# Patient Record
Sex: FEMALE | Race: WHITE | NOT HISPANIC OR LATINO | Employment: OTHER | URBAN - METROPOLITAN AREA
[De-identification: names, ages, dates, MRNs, and addresses within clinical notes are randomized per-mention and may not be internally consistent; named-entity substitution may affect disease eponyms.]

---

## 2017-05-11 ENCOUNTER — PREPPED CHART (OUTPATIENT)
Dept: URBAN - METROPOLITAN AREA CLINIC 39 | Facility: CLINIC | Age: 57
End: 2017-05-11

## 2017-08-24 ENCOUNTER — FOLLOW UP (OUTPATIENT)
Dept: URBAN - METROPOLITAN AREA CLINIC 39 | Facility: CLINIC | Age: 57
End: 2017-08-24

## 2017-08-24 DIAGNOSIS — H04.123: ICD-10-CM

## 2017-08-24 DIAGNOSIS — H10.9: ICD-10-CM

## 2017-08-24 PROCEDURE — 99212 OFFICE O/P EST SF 10 MIN: CPT

## 2017-08-24 PROCEDURE — 68761S PUNCTUM PLUG / SILICONE,EACH

## 2017-08-24 ASSESSMENT — VISUAL ACUITY
OS_CC: 20/25
OS_BAT: 20/25
OD_CC: 20/25
OD_CC: J2
OD_SC: 20/<400
OU_CC: 20/25
OD_SC: J5
OS_CC: J1
OS_SC: 20/<400
OD_BAT: 20/25
OS_CC: 20/25
OS_SC: J6
OD_CC: 20/25

## 2017-08-24 ASSESSMENT — TONOMETRY
OS_IOP_MMHG: 15
OD_IOP_MMHG: 14

## 2017-09-07 ENCOUNTER — FOLLOW UP (OUTPATIENT)
Dept: URBAN - METROPOLITAN AREA CLINIC 39 | Facility: CLINIC | Age: 57
End: 2017-09-07

## 2017-09-07 DIAGNOSIS — H04.123: ICD-10-CM

## 2017-09-07 DIAGNOSIS — H10.9: ICD-10-CM

## 2017-09-07 PROCEDURE — 92012 INTRM OPH EXAM EST PATIENT: CPT

## 2017-09-07 RX ORDER — LOTEPREDNOL ETABONATE 2 MG/ML
1 SUSPENSION/ DROPS OPHTHALMIC
Start: 2017-09-07 | End: 2017-09-28

## 2017-09-07 ASSESSMENT — VISUAL ACUITY
OD_CC: 20/25
OS_CC: 20/25-2

## 2017-09-07 ASSESSMENT — TONOMETRY
OS_IOP_MMHG: 15
OD_IOP_MMHG: 15

## 2017-10-02 NOTE — PROCEDURE NOTE: CLINICAL
PROCEDURE NOTE: YAG Capsulotomy OD. Diagnosis: Visually Significant PCO. Anesthesia: Topical. Prior to treatment, the risks/benefits/alternatives were discussed. The patient wished to proceed with procedure. Power = 2.8 mJ. Number of pulses = 16. Patient tolerated procedure well. There were no complications. 1 gtt of Alphagan was instilled after laser. Post laser IOP = 11 mmHg. Post-procedure instructions given. Scot Arboleda

## 2017-10-03 NOTE — PROCEDURE NOTE: CLINICAL
PROCEDURE NOTE: YAG Capsulotomy OS. Diagnosis: Visually Significant PCO. Anesthesia: Topical. Prior to treatment, the risks/benefits/alternatives were discussed. The patient wished to proceed with procedure. Power = 3.5 mJ. Number of pulses = 15. Patient tolerated procedure well. There were no complications. 1 gtt of Alphagan was instilled after laser. Post laser IOP = 11 mmHg. Post-procedure instructions given. Amelia Navarro

## 2017-10-04 NOTE — PATIENT DISCUSSION
The patient is status post Yag laser Capsulotomy in the right eye. The vision shows nice improvement.

## 2018-05-10 ENCOUNTER — ESTABLISHED COMPREHENSIVE EXAM (OUTPATIENT)
Dept: URBAN - METROPOLITAN AREA CLINIC 39 | Facility: CLINIC | Age: 58
End: 2018-05-10

## 2018-05-10 DIAGNOSIS — H00.026: ICD-10-CM

## 2018-05-10 DIAGNOSIS — H04.123: ICD-10-CM

## 2018-05-10 DIAGNOSIS — H00.023: ICD-10-CM

## 2018-05-10 PROCEDURE — 92015 DETERMINE REFRACTIVE STATE: CPT

## 2018-05-10 PROCEDURE — 92014 COMPRE OPH EXAM EST PT 1/>: CPT

## 2018-05-10 ASSESSMENT — VISUAL ACUITY
OS_CC: 20/25
OD_SC: 20/400
OS_CC: J1
OS_SC: J6
OD_CC: J1
OD_SC: J4
OD_CC: 20/25

## 2018-05-10 ASSESSMENT — TONOMETRY
OD_IOP_MMHG: 14
OS_IOP_MMHG: 15

## 2020-03-19 ENCOUNTER — ESTABLISHED COMPREHENSIVE EXAM (OUTPATIENT)
Dept: URBAN - METROPOLITAN AREA CLINIC 39 | Facility: CLINIC | Age: 60
End: 2020-03-19

## 2020-03-19 DIAGNOSIS — Z79.899: ICD-10-CM

## 2020-03-19 DIAGNOSIS — H52.203: ICD-10-CM

## 2020-03-19 DIAGNOSIS — H52.4: ICD-10-CM

## 2020-03-19 DIAGNOSIS — H04.123: ICD-10-CM

## 2020-03-19 DIAGNOSIS — H02.886: ICD-10-CM

## 2020-03-19 DIAGNOSIS — H52.13: ICD-10-CM

## 2020-03-19 DIAGNOSIS — H02.883: ICD-10-CM

## 2020-03-19 PROCEDURE — 92015 DETERMINE REFRACTIVE STATE: CPT

## 2020-03-19 PROCEDURE — 92014 COMPRE OPH EXAM EST PT 1/>: CPT

## 2020-03-19 ASSESSMENT — TONOMETRY
OD_IOP_MMHG: 14
OS_IOP_MMHG: 14

## 2020-03-19 ASSESSMENT — VISUAL ACUITY
OD_SC: J4
OD_SC: 20/400-
OS_CC: 20/30-2
OS_CC: J1
OS_SC: J4
OS_SC: 20/400-
OD_CC: 20/25-2
OD_CC: J1

## 2020-05-06 ENCOUNTER — ESTABLISHED COMPREHENSIVE EXAM (OUTPATIENT)
Dept: URBAN - METROPOLITAN AREA CLINIC 39 | Facility: CLINIC | Age: 60
End: 2020-05-06

## 2020-05-06 DIAGNOSIS — H02.883: ICD-10-CM

## 2020-05-06 DIAGNOSIS — Z79.899: ICD-10-CM

## 2020-05-06 DIAGNOSIS — H02.886: ICD-10-CM

## 2020-05-06 DIAGNOSIS — H04.123: ICD-10-CM

## 2020-05-06 PROCEDURE — 99211T TECH SERVICE

## 2020-05-06 PROCEDURE — 92083 EXTENDED VISUAL FIELD XM: CPT

## 2021-03-02 ENCOUNTER — EST. PATIENT EMERGENCY (OUTPATIENT)
Dept: URBAN - METROPOLITAN AREA CLINIC 39 | Facility: CLINIC | Age: 61
End: 2021-03-02

## 2021-03-02 DIAGNOSIS — H43.811: ICD-10-CM

## 2021-03-02 PROCEDURE — 92014 COMPRE OPH EXAM EST PT 1/>: CPT

## 2021-03-02 ASSESSMENT — TONOMETRY
OD_IOP_MMHG: 14
OS_IOP_MMHG: 14

## 2021-03-02 ASSESSMENT — VISUAL ACUITY
OD_CC: 20/20-1
OS_CC: 20/25-2

## 2023-04-06 ENCOUNTER — COMPREHENSIVE EXAM (OUTPATIENT)
Dept: URBAN - METROPOLITAN AREA CLINIC 39 | Facility: CLINIC | Age: 63
End: 2023-04-06

## 2023-04-06 DIAGNOSIS — H52.13: ICD-10-CM

## 2023-04-06 DIAGNOSIS — H43.811: ICD-10-CM

## 2023-04-06 DIAGNOSIS — H04.123: ICD-10-CM

## 2023-04-06 PROCEDURE — 92014 COMPRE OPH EXAM EST PT 1/>: CPT

## 2023-04-06 PROCEDURE — 92015 DETERMINE REFRACTIVE STATE: CPT

## 2023-04-06 ASSESSMENT — VISUAL ACUITY
OD_SC: J4
OU_CC: J2
OS_SC: J4
OU_SC: J4
OD_CC: 20/20
OS_CC: J2
OU_SC: 20/400
OD_CC: J2
OU_CC: 20/20
OD_SC: 20/400
OS_CC: 20/25
OS_SC: CF 6FT

## 2023-04-06 ASSESSMENT — TONOMETRY
OS_IOP_MMHG: 16
OD_IOP_MMHG: 14

## 2024-04-29 ENCOUNTER — OV NP (OUTPATIENT)
Dept: URBAN - METROPOLITAN AREA CLINIC 60 | Facility: CLINIC | Age: 64
End: 2024-04-29
Payer: OTHER GOVERNMENT

## 2024-04-29 VITALS
SYSTOLIC BLOOD PRESSURE: 122 MMHG | HEIGHT: 63 IN | BODY MASS INDEX: 47.84 KG/M2 | TEMPERATURE: 98.7 F | HEART RATE: 71 BPM | OXYGEN SATURATION: 98 % | WEIGHT: 270 LBS | DIASTOLIC BLOOD PRESSURE: 76 MMHG

## 2024-04-29 DIAGNOSIS — Z12.11 COLON CANCER SCREENING: ICD-10-CM

## 2024-04-29 DIAGNOSIS — K31.84 GASTROPARESIS: ICD-10-CM

## 2024-04-29 DIAGNOSIS — K58.0 IRRITABLE BOWEL SYNDROME WITH DIARRHEA: ICD-10-CM

## 2024-04-29 DIAGNOSIS — K21.9 GERD WITHOUT ESOPHAGITIS: ICD-10-CM

## 2024-04-29 PROBLEM — 266435005: Status: ACTIVE | Noted: 2024-04-29

## 2024-04-29 PROBLEM — 197125005: Status: ACTIVE | Noted: 2024-04-29

## 2024-04-29 PROBLEM — 235675006: Status: ACTIVE | Noted: 2024-04-29

## 2024-04-29 PROCEDURE — 99204 OFFICE O/P NEW MOD 45 MIN: CPT

## 2024-04-29 RX ORDER — ONDANSETRON 4 MG/1
TABLET, ORALLY DISINTEGRATING ORAL
Qty: 30 TABLET | Status: ACTIVE | COMMUNITY

## 2024-04-29 RX ORDER — DICYCLOMINE HYDROCHLORIDE 10 MG/1
2 CAPSULES CAPSULE ORAL THREE TIMES A DAY
Qty: 180 CAPSULE | Refills: 1 | OUTPATIENT
Start: 2024-04-29 | End: 2024-06-28

## 2024-04-29 RX ORDER — HYDROXYCHLOROQUINE SULFATE 200 MG/1
AS DIRECTED TABLET ORAL
Status: ACTIVE | COMMUNITY

## 2024-04-29 RX ORDER — PREDNISONE 1 MG/1
TABLET ORAL
Qty: 360 TABLET | Status: ACTIVE | COMMUNITY

## 2024-04-29 RX ORDER — CHOLESTYRAMINE 4 G/9G
1 SCOOP POWDER, FOR SUSPENSION ORAL ONCE A DAY
Qty: 90 | Refills: 3 | OUTPATIENT
Start: 2024-04-29

## 2024-04-29 RX ORDER — LEVOTHYROXINE SODIUM 88 UG/1
TABLET ORAL
Qty: 90 TABLET | Status: ACTIVE | COMMUNITY

## 2024-04-29 NOTE — HPI-PREVIOUS IMAGING
Gastric emptying study 6/2022, 92% retention at 2 hours, 63% retention at 4 hours.  Demonstrated delay with 100% retention at 1 hour

## 2024-04-29 NOTE — HPI-PREVIOUS PROCEDURES
EGD 4/21/2022 consistent with Z-line 32 cm, normal duodenum, retained bilious fluid, no celiac no H. pylori acute and chronic reflux at GE junction. Colonoscopy 4/21/2022 for history of adenoma, lymphocytic colitis and difficulty passing bowel movements consistent with grade 1 internal hemorrhoids Normal terminal ileum, normal mucosa, biopsy negative for lymphocytic colitis.

## 2024-04-29 NOTE — HPI-ZZZTODAY'S VISIT
Silvia is a very pleasant 63-year-old female who presents for gastroparesis.  She is new patient to our practice will be establishing with Dr. Espinal today.  Past medical history significant for colon polyps, GERD, IBS, arthritis, hyperlipidemia, hypertension, nerve/muscle disease, fibromyalgia, gastroparesis, lymphocytic colitis, Sjogren's. Past surgical history significant for cholecystectomy, uterine ablation, orthopedic procedure, , tonsillectomy.  Last colonoscopy and EGD 2022.  Family history significant for paternal rectal cancer.  Patient has previously been on budesonide taper for lymphocytic colitis and recommended to use Pepto-Bismol instead. Possible use of Xifaxan has been discussed previously. Patient was given samples previously of Gimoti. In addition she was given a refill for budesonide. She was also referred to nutrition for further evaluation.  Silvia presents to establish care. She did not feel that the Gimoti or budesonide helped. She takes imodium a day to keep her bowel in control but still has bowel movements a "couple" times a day. Pepto Bismol did no seem to help much either. She denies dysphagia, dyspepsia, pyrosis, abdominal pain, change in bowel habits, unintentional weight loss, melena, or hematochezia.

## 2024-05-08 ENCOUNTER — WEB ENCOUNTER (OUTPATIENT)
Dept: URBAN - METROPOLITAN AREA CLINIC 60 | Facility: CLINIC | Age: 64
End: 2024-05-08

## 2024-07-29 ENCOUNTER — DASHBOARD ENCOUNTERS (OUTPATIENT)
Age: 64
End: 2024-07-29

## 2024-07-29 ENCOUNTER — OFFICE VISIT (OUTPATIENT)
Dept: URBAN - METROPOLITAN AREA CLINIC 60 | Facility: CLINIC | Age: 64
End: 2024-07-29
Payer: OTHER GOVERNMENT

## 2024-07-29 VITALS
DIASTOLIC BLOOD PRESSURE: 74 MMHG | HEART RATE: 74 BPM | WEIGHT: 171.2 LBS | TEMPERATURE: 98.2 F | BODY MASS INDEX: 30.33 KG/M2 | HEIGHT: 63 IN | OXYGEN SATURATION: 98 % | SYSTOLIC BLOOD PRESSURE: 124 MMHG

## 2024-07-29 DIAGNOSIS — K58.0 IRRITABLE BOWEL SYNDROME WITH DIARRHEA: ICD-10-CM

## 2024-07-29 DIAGNOSIS — K31.84 GASTROPARESIS: ICD-10-CM

## 2024-07-29 PROCEDURE — 99214 OFFICE O/P EST MOD 30 MIN: CPT

## 2024-07-29 RX ORDER — ACETAMINOPHEN 325 MG
1 TABLET AS NEEDED TABLET ORAL
Status: ACTIVE | COMMUNITY
Start: 2024-07-29

## 2024-07-29 RX ORDER — GABAPENTIN 300 MG/1
CAPSULE ORAL
Qty: 30 CAPSULE | Status: ACTIVE | COMMUNITY

## 2024-07-29 RX ORDER — MULTIVITAMIN
1 TABLET TABLET ORAL ONCE A DAY
Qty: 30 | Status: ACTIVE | COMMUNITY
Start: 2024-07-29

## 2024-07-29 RX ORDER — LEVOTHYROXINE SODIUM 88 UG/1
TABLET ORAL
Qty: 90 TABLET | Status: ACTIVE | COMMUNITY

## 2024-07-29 RX ORDER — RIFAXIMIN 550 MG/1
1 TABLET TABLET ORAL THREE TIMES A DAY
Qty: 42 TABLET | Refills: 0 | OUTPATIENT
Start: 2024-07-29

## 2024-07-29 RX ORDER — CHOLESTYRAMINE 4 G/9G
1 SCOOP POWDER, FOR SUSPENSION ORAL ONCE A DAY
Qty: 90 | Refills: 3 | Status: ACTIVE | COMMUNITY
Start: 2024-04-29

## 2024-07-29 NOTE — HPI-ZZZTODAY'S VISIT
Silvia is a very pleasant 63-year-old female who presents for gastroparesis and chronic diarrhea.  She is new patient to our practice will be establishing with Dr. Espinal today.  Past medical history significant for colon polyps, GERD, IBS, arthritis, hyperlipidemia, hypertension, nerve/muscle disease, fibromyalgia, gastroparesis, lymphocytic colitis Sjogren's.  Past surgical history significant for cholecystectomy, uterine ablation, orthopedic procedure, , tonsillectomy.  Last colonoscopy and EGD 2022.  Family history significant for paternal rectal cancer.  Patient has previously been on budesonide taper for lymphocytic colitis and recommended to use Pepto-Bismol instead. Patient was given samples previously of Gimoti. In addition she was given a refill for budesonide. She was also referred to nutrition for further evaluation which turned out to not be covered by . She did not feel she got much use out of her one meeting.  History of chronic diarrhea. She did not feel that the Gimoti or budesonide helped. She takes 1-2 imodium a day to keep her bowel in control but still has bowel movements a "couple" times a day. Pepto Bismol did no seem to help much either. She tried cholestyramine but could not tolerated it.  She was wondering if there were any alternatives to help.  We did discuss her multiple etiologies for her chronic diarrhea and she has had a previous diagnosis of lymphocytic colitis but of note her last colonoscopy with biopsies negative for it, cholecystectomy as well as decades issues with IBS-D.  We discussed her different options and she would like to pursue Xifaxan therapy.  She denies dysphagia, dyspepsia, pyrosis, abdominal pain, change in bowel habits, unintentional weight loss, melena, or hematochezia.

## 2024-08-01 ENCOUNTER — APPOINTMENT (RX ONLY)
Dept: URBAN - METROPOLITAN AREA CLINIC 148 | Facility: CLINIC | Age: 64
Setting detail: DERMATOLOGY
End: 2024-08-01

## 2024-08-01 DIAGNOSIS — L57.0 ACTINIC KERATOSIS: ICD-10-CM

## 2024-08-01 DIAGNOSIS — D18.0 HEMANGIOMA: ICD-10-CM

## 2024-08-01 DIAGNOSIS — D485 NEOPLASM OF UNCERTAIN BEHAVIOR OF SKIN: ICD-10-CM

## 2024-08-01 DIAGNOSIS — L81.4 OTHER MELANIN HYPERPIGMENTATION: ICD-10-CM

## 2024-08-01 DIAGNOSIS — L82.0 INFLAMED SEBORRHEIC KERATOSIS: ICD-10-CM

## 2024-08-01 DIAGNOSIS — L82.1 OTHER SEBORRHEIC KERATOSIS: ICD-10-CM

## 2024-08-01 DIAGNOSIS — D22 MELANOCYTIC NEVI: ICD-10-CM

## 2024-08-01 PROBLEM — D22.5 MELANOCYTIC NEVI OF TRUNK: Status: ACTIVE | Noted: 2024-08-01

## 2024-08-01 PROBLEM — D18.01 HEMANGIOMA OF SKIN AND SUBCUTANEOUS TISSUE: Status: ACTIVE | Noted: 2024-08-01

## 2024-08-01 PROBLEM — D48.5 NEOPLASM OF UNCERTAIN BEHAVIOR OF SKIN: Status: ACTIVE | Noted: 2024-08-01

## 2024-08-01 PROCEDURE — ? SUNSCREEN RECOMMENDATIONS

## 2024-08-01 PROCEDURE — ? COUNSELING

## 2024-08-01 PROCEDURE — ? BIOPSY BY SHAVE METHOD

## 2024-08-01 PROCEDURE — 17003 DESTRUCT PREMALG LES 2-14: CPT

## 2024-08-01 PROCEDURE — ? DEFER

## 2024-08-01 PROCEDURE — ? LIQUID NITROGEN

## 2024-08-01 PROCEDURE — 17000 DESTRUCT PREMALG LESION: CPT | Mod: 59

## 2024-08-01 PROCEDURE — ? FULL BODY SKIN EXAM

## 2024-08-01 PROCEDURE — 99203 OFFICE O/P NEW LOW 30 MIN: CPT | Mod: 25

## 2024-08-01 PROCEDURE — 11102 TANGNTL BX SKIN SINGLE LES: CPT

## 2024-08-01 ASSESSMENT — LOCATION DETAILED DESCRIPTION DERM
LOCATION DETAILED: LEFT PROXIMAL DORSAL FOREARM
LOCATION DETAILED: EPIGASTRIC SKIN
LOCATION DETAILED: NASAL DORSUM
LOCATION DETAILED: LEFT INFRAMAMMARY CREASE (INNER QUADRANT)
LOCATION DETAILED: RIGHT LATERAL CANTHUS
LOCATION DETAILED: RIGHT INFRAMAMMARY CREASE (INNER QUADRANT)
LOCATION DETAILED: GLABELLA
LOCATION DETAILED: RIGHT PROXIMAL DORSAL FOREARM
LOCATION DETAILED: UPPER STERNUM
LOCATION DETAILED: LEFT SUPERIOR UPPER BACK
LOCATION DETAILED: RIGHT SUPERIOR UPPER BACK
LOCATION DETAILED: RIGHT MID-UPPER BACK
LOCATION DETAILED: LEFT INFERIOR ANTERIOR NECK
LOCATION DETAILED: MIDDLE STERNUM

## 2024-08-01 ASSESSMENT — LOCATION ZONE DERM
LOCATION ZONE: FACE
LOCATION ZONE: NECK
LOCATION ZONE: ARM
LOCATION ZONE: TRUNK
LOCATION ZONE: NOSE
LOCATION ZONE: EYELID

## 2024-08-01 ASSESSMENT — LOCATION SIMPLE DESCRIPTION DERM
LOCATION SIMPLE: RIGHT FOREARM
LOCATION SIMPLE: RIGHT BREAST
LOCATION SIMPLE: LEFT ANTERIOR NECK
LOCATION SIMPLE: ABDOMEN
LOCATION SIMPLE: LEFT FOREARM
LOCATION SIMPLE: GLABELLA
LOCATION SIMPLE: LEFT UPPER BACK
LOCATION SIMPLE: RIGHT EYELID
LOCATION SIMPLE: CHEST
LOCATION SIMPLE: LEFT BREAST
LOCATION SIMPLE: RIGHT UPPER BACK
LOCATION SIMPLE: NOSE

## 2024-08-01 NOTE — PROCEDURE: DEFER
Procedure To Be Performed At Next Visit: Cautery
Introduction Text (Please End With A Colon): The following procedure was deferred:
Size Of Lesion In Cm (Optional): 0
Detail Level: Simple

## 2024-08-01 NOTE — PROCEDURE: LIQUID NITROGEN
Duration Of Freeze Thaw-Cycle (Seconds): 2
Number Of Freeze-Thaw Cycles: 2 freeze-thaw cycles
Detail Level: Zone
Post-Care Instructions: I reviewed with the patient in detail post-care instructions. Patient is to wear sunprotection, and avoid picking at any of the treated lesions. Pt may apply Vaseline to crusted or scabbing areas.
Render Note In Bullet Format When Appropriate: No
Show Aperture Variable?: Yes
Consent: The patient's consent was obtained including but not limited to risks of crusting, scabbing, blistering, scarring, darker or lighter pigmentary change, recurrence, incomplete removal and infection.

## 2024-08-01 NOTE — PROCEDURE: REASSURANCE
Hide Include Location In Plan Question?: No
Additional Note: Benign appearing
Detail Level: Zone
09-Mar-2023

## 2024-08-05 ENCOUNTER — WEB ENCOUNTER (OUTPATIENT)
Dept: URBAN - METROPOLITAN AREA CLINIC 60 | Facility: CLINIC | Age: 64
End: 2024-08-05

## 2024-08-05 RX ORDER — RIFAXIMIN 550 MG/1
1 TABLET TABLET ORAL THREE TIMES A DAY
Qty: 42 TABLET | Refills: 0
Start: 2024-08-06 | End: 2024-08-20

## 2024-08-20 ENCOUNTER — TELEPHONE ENCOUNTER (OUTPATIENT)
Dept: URBAN - METROPOLITAN AREA CLINIC 63 | Facility: CLINIC | Age: 64
End: 2024-08-20

## 2024-08-27 ENCOUNTER — WEB ENCOUNTER (OUTPATIENT)
Dept: URBAN - METROPOLITAN AREA CLINIC 60 | Facility: CLINIC | Age: 64
End: 2024-08-27

## 2024-09-09 ENCOUNTER — OFFICE VISIT (OUTPATIENT)
Dept: URBAN - METROPOLITAN AREA CLINIC 60 | Facility: CLINIC | Age: 64
End: 2024-09-09
Payer: OTHER GOVERNMENT

## 2024-09-09 VITALS
BODY MASS INDEX: 28.84 KG/M2 | HEART RATE: 62 BPM | HEIGHT: 63 IN | SYSTOLIC BLOOD PRESSURE: 126 MMHG | TEMPERATURE: 98.6 F | OXYGEN SATURATION: 99 % | WEIGHT: 162.8 LBS | DIASTOLIC BLOOD PRESSURE: 72 MMHG

## 2024-09-09 DIAGNOSIS — K52.9 CHRONIC DIARRHEA: ICD-10-CM

## 2024-09-09 DIAGNOSIS — Z90.49 S/P CHOLECYSTECTOMY: ICD-10-CM

## 2024-09-09 DIAGNOSIS — K31.84 GASTROPARESIS: ICD-10-CM

## 2024-09-09 PROBLEM — 428882003: Status: ACTIVE | Noted: 2024-09-09

## 2024-09-09 PROCEDURE — 99214 OFFICE O/P EST MOD 30 MIN: CPT

## 2024-09-09 RX ORDER — MULTIVITAMIN
1 TABLET TABLET ORAL ONCE A DAY
Qty: 30 | Status: ACTIVE | COMMUNITY
Start: 2024-07-29

## 2024-09-09 RX ORDER — COLESTIPOL HYDROCHLORIDE 1 G/1
2 TABLETS TABLET, FILM COATED ORAL TWICE DAILY
Qty: 120 | Refills: 3 | OUTPATIENT
Start: 2024-09-09

## 2024-09-09 RX ORDER — ACETAMINOPHEN 325 MG/1
1 TABLET AS NEEDED TABLET, FILM COATED ORAL
Status: ACTIVE | COMMUNITY
Start: 2024-07-29

## 2024-09-09 RX ORDER — LEVOTHYROXINE SODIUM 88 UG/1
TABLET ORAL
Qty: 90 TABLET | Status: ACTIVE | COMMUNITY

## 2024-09-09 RX ORDER — LOPERAMIDE HCL 2 MG
1 CAPSULE AS NEEDED CAPSULE ORAL
Status: ACTIVE | COMMUNITY

## 2024-09-09 RX ORDER — CHOLESTYRAMINE 4 G/9G
1 SCOOP POWDER, FOR SUSPENSION ORAL ONCE A DAY
Qty: 90 | Refills: 3 | Status: ON HOLD | COMMUNITY
Start: 2024-04-29

## 2024-09-09 RX ORDER — GABAPENTIN 300 MG/1
CAPSULE ORAL
Qty: 30 CAPSULE | Status: ACTIVE | COMMUNITY

## 2024-09-09 RX ORDER — CEVIMELINE HYDROCHLORIDE 30 MG/1
1 CAPSULE CAPSULE ORAL THREE TIMES A DAY
Status: ACTIVE | COMMUNITY

## 2024-09-09 NOTE — HPI-ZZZTODAY'S VISIT
Patient returns for follow-up of gastroparesis and chronic diarrhea.  She tried 14 days Xifaxan treatment and did call the office and spoke with Dr. Espinal as patient was having heart palpitations while on medication.  He advised her to finish out her last 3 days of Xifaxan and to go to the ER if she develops chest pain or any worsening of her symptoms.  Her heart palpitations have completely resolved.  She did not notice any difference on the Xifaxan treatment.  Patient has complicated PMH with history of cholecystectomy, IBS-D as well as lymphocytic colitis seen on last colonoscopy in .  She is a nonresponder to budesonide, Xifaxan.  Imodium with only partial response.  She tried cholestyramine but could not tolerate it.  Her gastroparesis is well-managed on small meals throughout the day.  She does notice if she has a large meal she will "pay for it" and be bloated.  She continues to have 2-3 diarrhea bowel movements a day.  No abdominal pain or signs of GI bleeding.  No other GI symptoms.  Last OV 2024: Silvia is a very pleasant 63-year-old female who presents for gastroparesis and chronic diarrhea.  She is new patient to our practice will be establishing with Dr. Espinal today.  Past medical history significant for colon polyps, GERD, IBS, arthritis, hyperlipidemia, hypertension, nerve/muscle disease, fibromyalgia, gastroparesis, lymphocytic colitis Sjogren's.  Past surgical history significant for cholecystectomy, uterine ablation, orthopedic procedure, , tonsillectomy.  Last colonoscopy and EGD 2022.  Family history significant for paternal rectal cancer.  Patient has previously been on budesonide taper for lymphocytic colitis and recommended to use Pepto-Bismol instead. Patient was given samples previously of Gimoti. In addition she was given a refill for budesonide. She was also referred to nutrition for further evaluation which turned out to not be covered by . She did not feel she got much use out of her one meeting.  History of chronic diarrhea. She did not feel that the Gimoti or budesonide helped. She takes 1-2 imodium a day to keep her bowel in control but still has bowel movements a "couple" times a day. Pepto Bismol did no seem to help much either. She tried cholestyramine but could not tolerated it.  She was wondering if there were any alternatives to help.  We did discuss her multiple etiologies for her chronic diarrhea and she has had a previous diagnosis of lymphocytic colitis but of note her last colonoscopy with biopsies negative for it, cholecystectomy as well as decades issues with IBS-D.  We discussed her different options and she would like to pursue Xifaxan therapy.  She denies dysphagia, dyspepsia, pyrosis, abdominal pain, change in bowel habits, unintentional weight loss, melena, or hematochezia.

## 2024-09-09 NOTE — HPI-PREVIOUS PROCEDURES
EGD 4/21/2022 consistent with Z-line 32 cm, normal duodenum, retained bilious fluid, no celiac no H. pylori acute and chronic reflux at GE junction. Colonoscopy 4/21/2022 for history of adenoma, lymphocytic colitis and difficulty passing bowel movements consistent with grade 1 internal hemorrhoids Normal terminal ileum, normal mucosa, biopsy positive for lymphocytic colitis.

## 2024-09-20 ENCOUNTER — APPOINTMENT (RX ONLY)
Dept: URBAN - METROPOLITAN AREA CLINIC 148 | Facility: CLINIC | Age: 64
Setting detail: DERMATOLOGY
End: 2024-09-20

## 2024-09-20 DIAGNOSIS — L82.0 INFLAMED SEBORRHEIC KERATOSIS: ICD-10-CM

## 2024-09-20 DIAGNOSIS — L57.0 ACTINIC KERATOSIS: ICD-10-CM | Status: RESOLVED

## 2024-09-20 PROCEDURE — 99213 OFFICE O/P EST LOW 20 MIN: CPT | Mod: 25

## 2024-09-20 PROCEDURE — 17111 DESTRUCTION B9 LESIONS 15/>: CPT

## 2024-09-20 PROCEDURE — ? BENIGN DESTRUCTION

## 2024-09-20 PROCEDURE — ? COUNSELING

## 2024-09-20 ASSESSMENT — LOCATION DETAILED DESCRIPTION DERM
LOCATION DETAILED: LEFT MEDIAL ZYGOMA
LOCATION DETAILED: LEFT CENTRAL TEMPLE
LOCATION DETAILED: RIGHT SUPERIOR ANTERIOR NECK
LOCATION DETAILED: LEFT CLAVICULAR NECK
LOCATION DETAILED: RIGHT INFERIOR LATERAL NECK
LOCATION DETAILED: RIGHT INFERIOR ANTERIOR NECK
LOCATION DETAILED: LEFT INFERIOR ANTERIOR NECK
LOCATION DETAILED: RIGHT LATERAL CANTHUS
LOCATION DETAILED: GLABELLA
LOCATION DETAILED: RIGHT ANTERIOR SHOULDER
LOCATION DETAILED: RIGHT CLAVICULAR NECK
LOCATION DETAILED: LEFT INFERIOR LATERAL NECK
LOCATION DETAILED: LEFT SUPERIOR UPPER BACK
LOCATION DETAILED: LEFT SUPERIOR LATERAL NECK
LOCATION DETAILED: RIGHT CLAVICULAR SKIN
LOCATION DETAILED: RIGHT SUPERIOR LATERAL NECK

## 2024-09-20 ASSESSMENT — LOCATION ZONE DERM
LOCATION ZONE: ARM
LOCATION ZONE: TRUNK
LOCATION ZONE: EYELID
LOCATION ZONE: FACE
LOCATION ZONE: NECK

## 2024-09-20 ASSESSMENT — LOCATION SIMPLE DESCRIPTION DERM
LOCATION SIMPLE: RIGHT ANTERIOR NECK
LOCATION SIMPLE: RIGHT SHOULDER
LOCATION SIMPLE: LEFT TEMPLE
LOCATION SIMPLE: GLABELLA
LOCATION SIMPLE: RIGHT EYELID
LOCATION SIMPLE: LEFT UPPER BACK
LOCATION SIMPLE: RIGHT CLAVICULAR SKIN
LOCATION SIMPLE: LEFT ZYGOMA
LOCATION SIMPLE: LEFT ANTERIOR NECK

## 2024-09-20 NOTE — PROCEDURE: BENIGN DESTRUCTION
Include Z78.9 (Other Specified Conditions Influencing Health Status) As An Associated Diagnosis?: No
Treatment Number (Will Not Render If 0): 0
Detail Level: Zone
Post-Care Instructions: I reviewed with the patient in detail post-care instructions. Patient is to wear sunprotection, and avoid picking at any of the treated lesions. Pt may apply Vaseline to crusted or scabbing areas.
Medical Necessity Information: It is in your best interest to select a reason for this procedure from the list below. All of these items fulfill various CMS LCD requirements except the new and changing color options.
Consent: The patient's consent was obtained including but not limited to risks of crusting, scabbing, blistering, scarring, darker or lighter pigmentary change, recurrence, incomplete removal and infection.
Medical Necessity Clause: This procedure was medically necessary because the lesions that were treated were:

## 2024-10-19 ENCOUNTER — WEB ENCOUNTER (OUTPATIENT)
Dept: URBAN - METROPOLITAN AREA CLINIC 60 | Facility: CLINIC | Age: 64
End: 2024-10-19

## 2024-10-21 ENCOUNTER — WEB ENCOUNTER (OUTPATIENT)
Dept: URBAN - METROPOLITAN AREA CLINIC 60 | Facility: CLINIC | Age: 64
End: 2024-10-21

## 2024-10-30 ENCOUNTER — APPOINTMENT (RX ONLY)
Dept: URBAN - METROPOLITAN AREA CLINIC 148 | Facility: CLINIC | Age: 64
Setting detail: DERMATOLOGY
End: 2024-10-30

## 2024-10-30 DIAGNOSIS — L82.0 INFLAMED SEBORRHEIC KERATOSIS: ICD-10-CM | Status: RESOLVED

## 2024-10-30 DIAGNOSIS — L82.1 OTHER SEBORRHEIC KERATOSIS: ICD-10-CM

## 2024-10-30 PROCEDURE — 99212 OFFICE O/P EST SF 10 MIN: CPT

## 2024-10-30 PROCEDURE — ? COUNSELING

## 2024-10-30 ASSESSMENT — LOCATION SIMPLE DESCRIPTION DERM
LOCATION SIMPLE: LEFT ANTERIOR NECK
LOCATION SIMPLE: RIGHT ANTERIOR NECK

## 2024-10-30 ASSESSMENT — LOCATION DETAILED DESCRIPTION DERM
LOCATION DETAILED: RIGHT INFERIOR LATERAL NECK
LOCATION DETAILED: RIGHT INFERIOR ANTERIOR NECK
LOCATION DETAILED: LEFT INFERIOR LATERAL NECK

## 2024-10-30 ASSESSMENT — LOCATION ZONE DERM: LOCATION ZONE: NECK

## 2024-12-10 ENCOUNTER — OFFICE VISIT (OUTPATIENT)
Dept: URBAN - METROPOLITAN AREA CLINIC 60 | Facility: CLINIC | Age: 64
End: 2024-12-10
Payer: OTHER GOVERNMENT

## 2024-12-10 VITALS
HEART RATE: 64 BPM | TEMPERATURE: 98.2 F | DIASTOLIC BLOOD PRESSURE: 60 MMHG | HEIGHT: 63 IN | WEIGHT: 166 LBS | BODY MASS INDEX: 29.41 KG/M2 | SYSTOLIC BLOOD PRESSURE: 120 MMHG | OXYGEN SATURATION: 97 %

## 2024-12-10 DIAGNOSIS — Z90.49 S/P CHOLECYSTECTOMY: ICD-10-CM

## 2024-12-10 DIAGNOSIS — K31.84 GASTROPARESIS: ICD-10-CM

## 2024-12-10 DIAGNOSIS — K52.9 CHRONIC DIARRHEA: ICD-10-CM

## 2024-12-10 PROCEDURE — 99214 OFFICE O/P EST MOD 30 MIN: CPT

## 2024-12-10 RX ORDER — LOPERAMIDE HCL 2 MG
1 CAPSULE AS NEEDED CAPSULE ORAL
Status: ACTIVE | COMMUNITY

## 2024-12-10 RX ORDER — COLESTIPOL HYDROCHLORIDE 1 G/1
2 TABLETS TABLET, FILM COATED ORAL TWICE DAILY
Qty: 120 | Refills: 3 | Status: ACTIVE | COMMUNITY
Start: 2024-09-09

## 2024-12-10 RX ORDER — CHOLESTYRAMINE 4 G/9G
1 SCOOP POWDER, FOR SUSPENSION ORAL ONCE A DAY
Qty: 90 | Refills: 3 | Status: ACTIVE | COMMUNITY
Start: 2024-04-29

## 2024-12-10 RX ORDER — GABAPENTIN 300 MG/1
CAPSULE ORAL
Qty: 30 CAPSULE | Status: ACTIVE | COMMUNITY

## 2024-12-10 RX ORDER — CEVIMELINE HYDROCHLORIDE 30 MG/1
1 CAPSULE CAPSULE ORAL THREE TIMES A DAY
Status: ACTIVE | COMMUNITY

## 2024-12-10 RX ORDER — LEVOTHYROXINE SODIUM 88 UG/1
TABLET ORAL
Qty: 90 TABLET | Status: ACTIVE | COMMUNITY

## 2024-12-10 RX ORDER — ACETAMINOPHEN 325 MG/1
1 TABLET AS NEEDED TABLET, FILM COATED ORAL
Status: ACTIVE | COMMUNITY
Start: 2024-07-29

## 2024-12-10 RX ORDER — MULTIVITAMIN
1 TABLET TABLET ORAL ONCE A DAY
Qty: 30 | Status: ACTIVE | COMMUNITY
Start: 2024-07-29

## 2024-12-10 NOTE — HPI-ZZZTODAY'S VISIT
Patient returns to clinic for follow-up for chronic diarrhea secondary to cholecystectomy as well as gastroparesis.  She relates she continue to try to call around for nutritionist and to call her insurance to find a nutritionist that had an overseeing MD that would sign off on notes but was unlucky and unfortunately due to  she is unable to establish with a nutritionist unless an MD is signing her notes and overseeing.  Patient continues to consume low residue frequent small meals which overall helped her symptoms but she still finds at times she feels very bloated and distended after certain meals.  We discussed adding Reglan sparingly and as needed and discuss risks and benefits of the medication and patient would like to try the medication to use as needed.  Still with intermittent diarrhea only somewhat controlled with Colestid 3 tablets twice daily.  Still reporting 2-3 loose/watery stools in the morning but occasionally has an episode when she is eating later in the day where she has to run to her bathroom.  She does state that previously Imodium had worked pretty well for her and she finds if she tries to intake too much of the Colestid it can affect her gastroparesis symptoms.  We discussed adding the Imodium once a day to help control her diarrhea.  No signs of GI bleeding, unintentional weight loss, dysphagia, reflux, vomiting, abdominal pain.  Last OV 2024: Patient returns for follow-up of gastroparesis and chronic diarrhea.  She tried 14 days Xifaxan treatment and did call the office and spoke with Dr. Espinal as patient was having heart palpitations while on medication.  He advised her to finish out her last 3 days of Xifaxan and to go to the ER if she develops chest pain or any worsening of her symptoms.  Her heart palpitations have completely resolved.  She did not notice any difference on the Xifaxan treatment.  Patient has complicated PMH with history of cholecystectomy, IBS-D as well as lymphocytic colitis seen on last colonoscopy in .  She is a nonresponder to budesonide, Xifaxan.  Imodium with only partial response.  She tried cholestyramine but could not tolerate it.  Her gastroparesis is well-managed on small meals throughout the day.  She does notice if she has a large meal she will "pay for it" and be bloated.  She continues to have 2-3 diarrhea bowel movements a day.  No abdominal pain or signs of GI bleeding.  No other GI symptoms.  Last OV 2024: Silvia is a very pleasant 63-year-old female who presents for gastroparesis and chronic diarrhea.  She is new patient to our practice will be establishing with Dr. Espinal today.  Past medical history significant for colon polyps, GERD, IBS, arthritis, hyperlipidemia, hypertension, nerve/muscle disease, fibromyalgia, gastroparesis, lymphocytic colitis Sjogren's.  Past surgical history significant for cholecystectomy, uterine ablation, orthopedic procedure, , tonsillectomy.  Last colonoscopy and EGD 2022.  Family history significant for paternal rectal cancer.  Patient has previously been on budesonide taper for lymphocytic colitis and recommended to use Pepto-Bismol instead. Patient was given samples previously of Gimoti. In addition she was given a refill for budesonide. She was also referred to nutrition for further evaluation which turned out to not be covered by . She did not feel she got much use out of her one meeting.  History of chronic diarrhea. She did not feel that the Gimoti or budesonide helped. She takes 1-2 imodium a day to keep her bowel in control but still has bowel movements a "couple" times a day. Pepto Bismol did no seem to help much either. She tried cholestyramine but could not tolerated it.  She was wondering if there were any alternatives to help.  We did discuss her multiple etiologies for her chronic diarrhea and she has had a previous diagnosis of lymphocytic colitis but of note her last colonoscopy with biopsies negative for it, cholecystectomy as well as decades issues with IBS-D.  We discussed her different options and she would like to pursue Xifaxan therapy.  She denies dysphagia, dyspepsia, pyrosis, abdominal pain, change in bowel habits, unintentional weight loss, melena, or hematochezia.

## 2025-04-07 ENCOUNTER — WEB ENCOUNTER (OUTPATIENT)
Dept: URBAN - METROPOLITAN AREA CLINIC 60 | Facility: CLINIC | Age: 65
End: 2025-04-07

## 2025-06-10 ENCOUNTER — OFFICE VISIT (OUTPATIENT)
Dept: URBAN - METROPOLITAN AREA CLINIC 60 | Facility: CLINIC | Age: 65
End: 2025-06-10

## 2025-06-20 ENCOUNTER — OFFICE VISIT (OUTPATIENT)
Dept: URBAN - METROPOLITAN AREA CLINIC 60 | Facility: CLINIC | Age: 65
End: 2025-06-20

## 2025-06-23 ENCOUNTER — OFFICE VISIT (OUTPATIENT)
Dept: URBAN - METROPOLITAN AREA CLINIC 60 | Facility: CLINIC | Age: 65
End: 2025-06-23
Payer: OTHER GOVERNMENT

## 2025-06-23 DIAGNOSIS — K52.9 CHRONIC DIARRHEA: ICD-10-CM

## 2025-06-23 DIAGNOSIS — Z90.49 S/P CHOLECYSTECTOMY: ICD-10-CM

## 2025-06-23 DIAGNOSIS — K31.84 GASTROPARESIS: ICD-10-CM

## 2025-06-23 PROCEDURE — 99214 OFFICE O/P EST MOD 30 MIN: CPT

## 2025-06-23 RX ORDER — LEVOTHYROXINE SODIUM 88 UG/1
TABLET ORAL
Qty: 90 TABLET | Status: ACTIVE | COMMUNITY

## 2025-06-23 RX ORDER — LOPERAMIDE HCL 2 MG
1 CAPSULE AS NEEDED CAPSULE ORAL
Status: ACTIVE | COMMUNITY

## 2025-06-23 RX ORDER — ACETAMINOPHEN 325 MG/1
1 TABLET AS NEEDED TABLET, FILM COATED ORAL
Status: ACTIVE | COMMUNITY
Start: 2024-07-29

## 2025-06-23 RX ORDER — GABAPENTIN 300 MG/1
CAPSULE ORAL
Qty: 30 CAPSULE | Status: ACTIVE | COMMUNITY

## 2025-06-23 RX ORDER — MULTIVITAMIN
1 TABLET TABLET ORAL ONCE A DAY
Qty: 30 | Status: ACTIVE | COMMUNITY
Start: 2024-07-29

## 2025-06-23 RX ORDER — ROSUVASTATIN 5 MG/1
TABLET, FILM COATED ORAL
Qty: 90 TABLET | Status: ACTIVE | COMMUNITY

## 2025-06-23 RX ORDER — CEVIMELINE HYDROCHLORIDE 30 MG/1
1 CAPSULE CAPSULE ORAL THREE TIMES A DAY
Status: ACTIVE | COMMUNITY

## 2025-06-23 NOTE — HPI-ZZZTODAY'S VISIT
Patient coming in for 6-month follow-up visit.  She reports that she was able to find a dietitian that works with the bariatric surgeon in [place] Dr. Romero.  They would take her [employer] insurance so she is asking for referral which is placed today.  She mostly manages her gastroparesis symptoms doing small, low residue meals and using Reglan if needed.  Chronic diarrhea responds well to Imodium but she feels that taking 1 pill/day causes constipation.  We discussed going to half a tab of Imodium per day.  Unable to take fiber or Colestid as they are difficult for her to digest.  No new symptoms.  OV 2024: Patient returns to clinic for follow-up for chronic diarrhea secondary to cholecystectomy as well as gastroparesis.  She relates she continue to try to call around for nutritionist and to call her insurance to find a nutritionist that had an overseeing MD that would sign off on notes but was unlucky and unfortunately due to  she is unable to establish with a nutritionist unless an MD is signing her notes and overseeing.  Patient continues to consume low residue frequent small meals which overall helped her symptoms but she still finds at times she feels very bloated and distended after certain meals.  We discussed adding Reglan sparingly and as needed and discuss risks and benefits of the medication and patient would like to try the medication to use as needed.  Still with intermittent diarrhea only somewhat controlled with Colestid 3 tablets twice daily.  Still reporting 2-3 loose/watery stools in the morning but occasionally has an episode when she is eating later in the day where she has to run to her bathroom.  She does state that previously Imodium had worked pretty well for her and she finds if she tries to intake too much of the Colestid it can affect her gastroparesis symptoms.  We discussed adding the Imodium once a day to help control her diarrhea.  No signs of GI bleeding, unintentional weight loss, dysphagia, reflux, vomiting, abdominal pain.  Last OV 2024: Patient returns for follow-up of gastroparesis and chronic diarrhea.  She tried 14 days Xifaxan treatment and did call the office and spoke with Dr. Espinal as patient was having heart palpitations while on medication.  He advised her to finish out her last 3 days of Xifaxan and to go to the ER if she develops chest pain or any worsening of her symptoms.  Her heart palpitations have completely resolved.  She did not notice any difference on the Xifaxan treatment.  Patient has complicated PMH with history of cholecystectomy, IBS-D as well as lymphocytic colitis seen on last colonoscopy in .  She is a nonresponder to budesonide, Xifaxan.  Imodium with only partial response.  She tried cholestyramine but could not tolerate it.  Her gastroparesis is well-managed on small meals throughout the day.  She does notice if she has a large meal she will "pay for it" and be bloated.  She continues to have 2-3 diarrhea bowel movements a day.  No abdominal pain or signs of GI bleeding.  No other GI symptoms.  Last OV 2024: Silvia is a very pleasant 63-year-old female who presents for gastroparesis and chronic diarrhea.  She is new patient to our practice will be establishing with Dr. Espinal today.  Past medical history significant for colon polyps, GERD, IBS, arthritis, hyperlipidemia, hypertension, nerve/muscle disease, fibromyalgia, gastroparesis, lymphocytic colitis Sjogren's.  Past surgical history significant for cholecystectomy, uterine ablation, orthopedic procedure, , tonsillectomy.  Last colonoscopy and EGD 2022.  Family history significant for paternal rectal cancer.  Patient has previously been on budesonide taper for lymphocytic colitis and recommended to use Pepto-Bismol instead. Patient was given samples previously of Gimoti. In addition she was given a refill for budesonide. She was also referred to nutrition for further evaluation which turned out to not be covered by . She did not feel she got much use out of her one meeting.  History of chronic diarrhea. She did not feel that the Gimoti or budesonide helped. She takes 1-2 imodium a day to keep her bowel in control but still has bowel movements a "couple" times a day. Pepto Bismol did no seem to help much either. She tried cholestyramine but could not tolerated it.  She was wondering if there were any alternatives to help.  We did discuss her multiple etiologies for her chronic diarrhea and she has had a previous diagnosis of lymphocytic colitis but of note her last colonoscopy with biopsies negative for it, cholecystectomy as well as decades issues with IBS-D.  We discussed her different options and she would like to pursue Xifaxan therapy.  She denies dysphagia, dyspepsia, pyrosis, abdominal pain, change in bowel habits, unintentional weight loss, melena, or hematochezia.

## 2025-08-04 ENCOUNTER — APPOINTMENT (OUTPATIENT)
Dept: URBAN - METROPOLITAN AREA CLINIC 148 | Facility: CLINIC | Age: 65
Setting detail: DERMATOLOGY
End: 2025-08-04

## 2025-08-04 DIAGNOSIS — L72.8 OTHER FOLLICULAR CYSTS OF THE SKIN AND SUBCUTANEOUS TISSUE: ICD-10-CM

## 2025-08-04 DIAGNOSIS — D18.0 HEMANGIOMA: ICD-10-CM

## 2025-08-04 DIAGNOSIS — L82.0 INFLAMED SEBORRHEIC KERATOSIS: ICD-10-CM

## 2025-08-04 DIAGNOSIS — L82.1 OTHER SEBORRHEIC KERATOSIS: ICD-10-CM

## 2025-08-04 DIAGNOSIS — D22 MELANOCYTIC NEVI: ICD-10-CM

## 2025-08-04 DIAGNOSIS — L81.4 OTHER MELANIN HYPERPIGMENTATION: ICD-10-CM

## 2025-08-04 PROBLEM — D18.01 HEMANGIOMA OF SKIN AND SUBCUTANEOUS TISSUE: Status: ACTIVE | Noted: 2025-08-04

## 2025-08-04 PROBLEM — D22.5 MELANOCYTIC NEVI OF TRUNK: Status: ACTIVE | Noted: 2025-08-04

## 2025-08-04 PROCEDURE — ? COUNSELING

## 2025-08-04 PROCEDURE — ? LIQUID NITROGEN

## 2025-08-04 PROCEDURE — ? FULL BODY SKIN EXAM

## 2025-08-04 PROCEDURE — ? ADDITIONAL NOTES

## 2025-08-04 PROCEDURE — ? SUNSCREEN RECOMMENDATIONS

## 2025-08-04 ASSESSMENT — LOCATION SIMPLE DESCRIPTION DERM
LOCATION SIMPLE: LEFT FOREARM
LOCATION SIMPLE: RIGHT FOREARM
LOCATION SIMPLE: LEFT THIGH
LOCATION SIMPLE: RIGHT SCALP
LOCATION SIMPLE: LEFT BREAST
LOCATION SIMPLE: POSTERIOR SCALP
LOCATION SIMPLE: LEFT UPPER BACK
LOCATION SIMPLE: RIGHT HAND
LOCATION SIMPLE: CHEST
LOCATION SIMPLE: RIGHT THIGH
LOCATION SIMPLE: RIGHT UPPER BACK
LOCATION SIMPLE: LEFT POSTERIOR THIGH
LOCATION SIMPLE: ABDOMEN
LOCATION SIMPLE: RIGHT BREAST
LOCATION SIMPLE: ANTERIOR SCALP
LOCATION SIMPLE: RIGHT SHOULDER
LOCATION SIMPLE: LEFT EYELID

## 2025-08-04 ASSESSMENT — LOCATION DETAILED DESCRIPTION DERM
LOCATION DETAILED: RIGHT SUPERIOR UPPER BACK
LOCATION DETAILED: POSTERIOR MID-PARIETAL SCALP
LOCATION DETAILED: RIGHT MID-UPPER BACK
LOCATION DETAILED: LEFT LATERAL UPPER BACK
LOCATION DETAILED: LEFT INFRAMAMMARY CREASE (INNER QUADRANT)
LOCATION DETAILED: EPIGASTRIC SKIN
LOCATION DETAILED: LEFT PROXIMAL DORSAL FOREARM
LOCATION DETAILED: UPPER STERNUM
LOCATION DETAILED: LEFT ANTERIOR PROXIMAL THIGH
LOCATION DETAILED: RIGHT ULNAR DORSAL HAND
LOCATION DETAILED: LEFT LATERAL CANTHUS
LOCATION DETAILED: RIGHT MEDIAL FRONTAL SCALP
LOCATION DETAILED: RIGHT ANTERIOR PROXIMAL THIGH
LOCATION DETAILED: RIGHT INFRAMAMMARY CREASE (INNER QUADRANT)
LOCATION DETAILED: RIGHT POSTERIOR SHOULDER
LOCATION DETAILED: MID-FRONTAL SCALP
LOCATION DETAILED: LEFT DISTAL POSTERIOR THIGH
LOCATION DETAILED: LEFT MEDIAL SUPERIOR CHEST
LOCATION DETAILED: MIDDLE STERNUM
LOCATION DETAILED: RIGHT PROXIMAL DORSAL FOREARM

## 2025-08-04 ASSESSMENT — LOCATION ZONE DERM
LOCATION ZONE: ARM
LOCATION ZONE: TRUNK
LOCATION ZONE: SCALP
LOCATION ZONE: EYELID
LOCATION ZONE: LEG
LOCATION ZONE: HAND

## 2025-08-04 NOTE — PROCEDURE: ADDITIONAL NOTES
Render Risk Assessment In Note?: no
Additional Notes: Recommended patient to exfoliate
Detail Level: Zone

## 2025-08-04 NOTE — HPI: EVALUATION OF SKIN LESION(S)
What Type Of Note Output Would You Prefer (Optional)?: Bullet Format
Hpi Title: Evaluation of Skin Lesions
How Severe Are Your Spot(S)?: mild
Have Your Spot(S) Been Treated In The Past?: has not been treated
Additional History: Spot on scalp ,spot on nose ,spot near eye ,spot on hand ,spot on leg

## 2025-08-04 NOTE — PROCEDURE: LIQUID NITROGEN
Detail Level: Zone
Consent: The patient's consent was obtained including but not limited to risks of crusting, scabbing, blistering, scarring, darker or lighter pigmentary change, recurrence, incomplete removal and infection.
Add 52 Modifier (Optional): no
Medical Necessity Information: It is in your best interest to select a reason for this procedure from the list below. All of these items fulfill various CMS LCD requirements except the new and changing color options.
Show Applicator Variable?: Yes
Medical Necessity Clause: This procedure was medically necessary because the lesions that were treated were:
Number Of Freeze-Thaw Cycles: 2 freeze-thaw cycles
Post-Care Instructions: I reviewed with the patient in detail post-care instructions. Patient is to wear sunprotection, and avoid picking at any of the treated lesions. Pt may apply Vaseline to crusted or scabbing areas.
Spray Paint Text: The liquid nitrogen was applied to the skin utilizing a spray paint frosting technique.
Duration Of Freeze Thaw-Cycle (Seconds): 5-15